# Patient Record
Sex: MALE | URBAN - METROPOLITAN AREA
[De-identification: names, ages, dates, MRNs, and addresses within clinical notes are randomized per-mention and may not be internally consistent; named-entity substitution may affect disease eponyms.]

---
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
## 2018-02-17 NOTE — TELEPHONE ENCOUNTER
2/16/2018    Call Regarding ReattributionPhysical       Attempt 2    Message on voicemail     Comments:       Outreach   Ginny Patel

## 2018-02-27 NOTE — TELEPHONE ENCOUNTER
2/26/2018    Call Regarding ReattributionPhysical       Attempt 3    Message on voicemail     Comments:       Outreach   SV